# Patient Record
Sex: MALE | Race: WHITE | NOT HISPANIC OR LATINO | URBAN - NONMETROPOLITAN AREA
[De-identification: names, ages, dates, MRNs, and addresses within clinical notes are randomized per-mention and may not be internally consistent; named-entity substitution may affect disease eponyms.]

---

## 2017-08-31 ENCOUNTER — FOLLOW UP ESTABLISHED (OUTPATIENT)
Dept: URBAN - NONMETROPOLITAN AREA CLINIC 7 | Facility: CLINIC | Age: 82
End: 2017-08-31
Payer: MEDICARE

## 2017-08-31 DIAGNOSIS — H35.3134 NEXDTVE AGE-REL MCLR DEGN, BI, ADV ATRPC WITH SBFVL INVOLV: ICD-10-CM

## 2017-08-31 DIAGNOSIS — H26.491 OTHER SECONDARY CATARACT, RIGHT EYE: ICD-10-CM

## 2017-08-31 DIAGNOSIS — E11.9 TYPE 2 DIABETES MELLITUS WITHOUT COMPLICATIONS: Primary | ICD-10-CM

## 2017-08-31 DIAGNOSIS — H31.012 MACULA SCARS OF POSTERIOR POLE (POST-TRAUMATIC), LEFT EYE: ICD-10-CM

## 2017-08-31 PROCEDURE — 92014 COMPRE OPH EXAM EST PT 1/>: CPT | Performed by: OPHTHALMOLOGY

## 2017-08-31 PROCEDURE — 92134 CPTRZ OPH DX IMG PST SGM RTA: CPT | Performed by: OPHTHALMOLOGY

## 2017-08-31 PROCEDURE — 83861 MICROFLUID ANALY TEARS: CPT | Performed by: OPHTHALMOLOGY

## 2017-08-31 PROCEDURE — 92250 FUNDUS PHOTOGRAPHY W/I&R: CPT | Performed by: OPHTHALMOLOGY

## 2017-08-31 ASSESSMENT — VISUAL ACUITY
OD: 20/40
OS: 20/150

## 2017-08-31 ASSESSMENT — INTRAOCULAR PRESSURE
OS: 13
OD: 13

## 2019-07-25 ENCOUNTER — FOLLOW UP ESTABLISHED (OUTPATIENT)
Dept: URBAN - NONMETROPOLITAN AREA CLINIC 7 | Facility: CLINIC | Age: 84
End: 2019-07-25
Payer: MEDICARE

## 2019-07-25 DIAGNOSIS — Z96.1 PRESENCE OF INTRAOCULAR LENS: ICD-10-CM

## 2019-07-25 DIAGNOSIS — E11.3291 DIABETES MELLITUS TYPE 2 WITH MILD NON-PROLIFERATI: Primary | ICD-10-CM

## 2019-07-25 DIAGNOSIS — Z79.84 LONG TERM (CURRENT) USE OF ORAL ANTIDIABETIC DRUGS: ICD-10-CM

## 2019-07-25 DIAGNOSIS — H43.813 VITREOUS DEGENERATION, BILATERAL: ICD-10-CM

## 2019-07-25 DIAGNOSIS — H35.3131 NONEXUDATIVE MACULAR DEGENERATION, EARLY DRY STAGE, BILATERAL: ICD-10-CM

## 2019-07-25 PROCEDURE — 92250 FUNDUS PHOTOGRAPHY W/I&R: CPT | Performed by: OPHTHALMOLOGY

## 2019-07-25 PROCEDURE — 92014 COMPRE OPH EXAM EST PT 1/>: CPT | Performed by: OPHTHALMOLOGY

## 2019-07-25 ASSESSMENT — KERATOMETRY
OD: 42.56
OS: 42.22

## 2019-07-25 ASSESSMENT — VISUAL ACUITY
OS: 20/200
OD: 20/20

## 2019-07-25 ASSESSMENT — INTRAOCULAR PRESSURE
OD: 15
OS: 15

## 2022-03-24 ENCOUNTER — OFFICE VISIT (OUTPATIENT)
Dept: URBAN - NONMETROPOLITAN AREA CLINIC 7 | Facility: CLINIC | Age: 87
End: 2022-03-24
Payer: MEDICARE

## 2022-03-24 DIAGNOSIS — H52.4 PRESBYOPIA: ICD-10-CM

## 2022-03-24 DIAGNOSIS — H35.372 PUCKERING OF MACULA, LEFT EYE: ICD-10-CM

## 2022-03-24 DIAGNOSIS — H04.123 TEAR FILM INSUFFICIENCY OF BILATERAL LACRIMAL GLANDS: ICD-10-CM

## 2022-03-24 DIAGNOSIS — H43.811 VITREOUS DETACHMENT OF RIGHT EYE: ICD-10-CM

## 2022-03-24 PROCEDURE — 92134 CPTRZ OPH DX IMG PST SGM RTA: CPT | Performed by: OPTOMETRIST

## 2022-03-24 PROCEDURE — 99204 OFFICE O/P NEW MOD 45 MIN: CPT | Performed by: OPTOMETRIST

## 2022-03-24 ASSESSMENT — VISUAL ACUITY
OD: 20/40
OS: 20/200

## 2022-03-24 ASSESSMENT — INTRAOCULAR PRESSURE
OD: 15
OS: 15

## 2022-03-24 NOTE — IMPRESSION/PLAN
Impression: Vitreous detachment of right eye: H43.811. Plan: Patient education regarding symptoms and risks of retinal holes, tears, and detachment. The patient was instructed to contact office immediately should symptoms occur. Continue to monitor.

## 2022-03-24 NOTE — IMPRESSION/PLAN
Impression: Type 2 diabetes mellitus without complications Plan: Currently, there are no signs of diabetic retinopathy are present. Patient education provided. The importance of blood sugar control discussed. I will continue to monitor.

## 2022-03-24 NOTE — IMPRESSION/PLAN
Impression: Nonexudative age-related macular degeneration, bilateral, advanced atrophic with subfoveal involvement Plan: Appears slightly worse today. I recommended use of AREDS-based vitamins. I will monitor for future change. Patient education provided. OCT ordered for future comparison for signs of progression.

## 2022-03-24 NOTE — IMPRESSION/PLAN
Impression: Other secondary cataract, right eye Plan: Not significantly affecting vision. Patient education provided. I will continue to monitor for change.

## 2022-03-24 NOTE — IMPRESSION/PLAN
Impression: Puckering of macula, left eye Plan: The condition appears to be stable. Continue to monitor.

## 2022-09-26 ENCOUNTER — OFFICE VISIT (OUTPATIENT)
Dept: URBAN - NONMETROPOLITAN AREA CLINIC 7 | Facility: CLINIC | Age: 87
End: 2022-09-26
Payer: MEDICARE

## 2022-09-26 DIAGNOSIS — H26.491 OTHER SECONDARY CATARACT, RIGHT EYE: ICD-10-CM

## 2022-09-26 DIAGNOSIS — H16.223 KERATOCONJUNCTIVITIS SICCA, BILATERAL: Primary | ICD-10-CM

## 2022-09-26 DIAGNOSIS — H43.811 VITREOUS DETACHMENT OF RIGHT EYE: ICD-10-CM

## 2022-09-26 DIAGNOSIS — H35.3134 NONEXUDATIVE AGE-RELATED MACULAR DEGENERATION, BILATERAL, ADVANCED ATROPHIC WITH SUBFOVEAL INVOLVEMENT: ICD-10-CM

## 2022-09-26 DIAGNOSIS — H35.372 PUCKERING OF MACULA, LEFT EYE: ICD-10-CM

## 2022-09-26 PROCEDURE — 92134 CPTRZ OPH DX IMG PST SGM RTA: CPT | Performed by: OPTOMETRIST

## 2022-09-26 PROCEDURE — 99213 OFFICE O/P EST LOW 20 MIN: CPT | Performed by: OPTOMETRIST

## 2022-09-26 ASSESSMENT — INTRAOCULAR PRESSURE
OS: 15
OD: 15

## 2022-09-26 NOTE — IMPRESSION/PLAN
Impression: Other secondary cataract, right eye Plan: Education was provided regarding current findings. Continue to monitor this condition.

## 2022-09-26 NOTE — IMPRESSION/PLAN
Impression: Nonexudative age-related macular degeneration, bilateral, advanced atrophic with subfoveal involvement: H35.3134. Plan: Stable. Continue ARED-vitamins. OCT stable. Monitor. Pt ed.

## 2023-03-28 ENCOUNTER — OFFICE VISIT (OUTPATIENT)
Dept: URBAN - NONMETROPOLITAN AREA CLINIC 7 | Facility: CLINIC | Age: 88
End: 2023-03-28
Payer: MEDICARE

## 2023-03-28 DIAGNOSIS — Z79.84 LONG TERM (CURRENT) USE OF ORAL ANTIDIABETIC DRUGS: ICD-10-CM

## 2023-03-28 DIAGNOSIS — Z96.1 PRESENCE OF INTRAOCULAR LENS: ICD-10-CM

## 2023-03-28 DIAGNOSIS — H43.811 VITREOUS DETACHMENT OF RIGHT EYE: ICD-10-CM

## 2023-03-28 DIAGNOSIS — E11.9 TYPE 2 DIABETES MELLITUS WITHOUT COMPLICATIONS: Primary | ICD-10-CM

## 2023-03-28 DIAGNOSIS — H26.491 OTHER SECONDARY CATARACT, RIGHT EYE: ICD-10-CM

## 2023-03-28 DIAGNOSIS — H35.372 PUCKERING OF MACULA, LEFT EYE: ICD-10-CM

## 2023-03-28 DIAGNOSIS — H35.3134 NONEXUDATIVE AGE-RELATED MACULAR DEGENERATION, BILATERAL, ADVANCED ATROPHIC WITH SUBFOVEAL INVOLVEMENT: ICD-10-CM

## 2023-03-28 PROCEDURE — 92014 COMPRE OPH EXAM EST PT 1/>: CPT | Performed by: OPTOMETRIST

## 2023-03-28 PROCEDURE — 92134 CPTRZ OPH DX IMG PST SGM RTA: CPT | Performed by: OPTOMETRIST

## 2023-03-28 ASSESSMENT — VISUAL ACUITY
OD: 20/40
OS: 20/200

## 2023-03-28 ASSESSMENT — INTRAOCULAR PRESSURE
OD: 17
OS: 17

## 2023-03-28 NOTE — IMPRESSION/PLAN
Impression: Nonexudative age-related macular degeneration, bilateral, advanced atrophic with subfoveal involvement: H35.3134. Plan: Appears stable today. I encouraged use of AREDS-based vitamins. I will monitor for future change. Patient education provided. OCT ordered for future comparison for signs of progression.

## 2023-03-28 NOTE — IMPRESSION/PLAN
Impression: Other secondary cataract, right eye Plan: The capsular opacification is not significantly affecting vision. Patient education provided. I will continue to monitor for change.

## 2023-03-28 NOTE — IMPRESSION/PLAN
Impression: Puckering of macula, left eye Plan: The findings appear to remain stable. Continue to monitor. Patient education on findings.

## 2023-03-28 NOTE — IMPRESSION/PLAN
Impression: Vitreous detachment of right eye: H43.811. Plan: Patient education provided regarding symptoms and risks of retinal holes, tears, and detachment. The patient was instructed to contact office immediately should symptoms occur. Continue to monitor.

## 2023-03-28 NOTE — IMPRESSION/PLAN
Impression: Type 2 diabetes mellitus without complications Plan: There are no signs of diabetic retinopathy present. Patient education provided. The importance of blood sugar control discussed. I will continue to monitor.

## 2023-03-28 NOTE — IMPRESSION/PLAN
Impression: Presence of intraocular lens ; OU Plan: The lens implant(s) appear clear and stable. Continue to monitor.

## 2023-09-28 ENCOUNTER — OFFICE VISIT (OUTPATIENT)
Dept: URBAN - NONMETROPOLITAN AREA CLINIC 7 | Facility: CLINIC | Age: 88
End: 2023-09-28
Payer: MEDICARE

## 2023-09-28 DIAGNOSIS — H35.3134 NONEXUDATIVE AGE-RELATED MACULAR DEGENERATION, BILATERAL, ADVANCED ATROPHIC WITH SUBFOVEAL INVOLVEMENT: Primary | ICD-10-CM

## 2023-09-28 DIAGNOSIS — Z96.1 PRESENCE OF INTRAOCULAR LENS: ICD-10-CM

## 2023-09-28 DIAGNOSIS — H35.372 PUCKERING OF MACULA, LEFT EYE: ICD-10-CM

## 2023-09-28 DIAGNOSIS — H52.4 PRESBYOPIA: ICD-10-CM

## 2023-09-28 PROCEDURE — 92014 COMPRE OPH EXAM EST PT 1/>: CPT | Performed by: OPTOMETRIST

## 2023-09-28 PROCEDURE — 92134 CPTRZ OPH DX IMG PST SGM RTA: CPT | Performed by: OPTOMETRIST

## 2023-09-28 ASSESSMENT — VISUAL ACUITY
OD: 20/20
OS: 20/150

## 2023-09-28 ASSESSMENT — INTRAOCULAR PRESSURE
OD: 14
OS: 14